# Patient Record
(demographics unavailable — no encounter records)

---

## 2024-11-29 NOTE — DISCUSSION/SUMMARY
[FreeTextEntry1] : Patient presents for: +abnormal EKG cardiac systolic murmur hypertension pre-diabetes obesity     Orders placed including imaging/meds:  - 2d TTE - c/w norvasc, weight loss management, lifestyle modifications. BP not optimal, no change in medical therapy during appt, recommend close follow up with PCP for adjustment of meds and we will obtain 2d TTE.   Patient warm and euvolemic. There is no evidence of active ACS, decompensated HF, uncontrolled arrhythmias or significant valvular heart disease at present. EKG is non-ischemic. We went over the EKG in office today, all questions answered. Vitals reviewed.   I've asked the patient to keep a blood pressure journal and report back if SBP >130 or DBP >90 on 2-3 separate random occasions. The patient is aware of alarm cardiac type symptoms, will call with questions or concerns and is aware to activate 911 and/or present to the closest emergency department if concerns.   Follow up: with me in 6 months or sooner as requested.     I strongly encouraged the patient to pursue the following preventative cardiology, lifestyle modifications which are necessary for long-term cardiovascular health. The following is recommended: Advised a mediterranean and/or plant predominant, high fiber, limited salt (ideal <2 g/day), low fat diet, stress reduction/psychosomatic management, sleep hygiene/KAELYN testing and healthy weight loss, annual influenza/preventive vaccines, medication + treatment adherence/compliance, high risk behavior mitigation (I.e. tobacco abstinence, alcohol abstinence, no binge drinking, recreational/illicit drug use abstinence), increased exercise activity aiming for 150 minutes per week of moderate physical activity as per AHA/ACC standard for long term cardiovascular risk reduction.    [EKG obtained to assist in diagnosis and management of assessed problem(s)] : EKG obtained to assist in diagnosis and management of assessed problem(s)

## 2024-11-29 NOTE — PHYSICAL EXAM
[Well Developed] : well developed [Well Nourished] : well nourished [No Acute Distress] : no acute distress [Obese] : obese [Normal Conjunctiva] : normal conjunctiva [Normal Venous Pressure] : normal venous pressure [No Carotid Bruit] : no carotid bruit [Normal S1, S2] : normal S1, S2 [No Murmur] : no murmur [No Rub] : no rub [No Gallop] : no gallop [Clear Lung Fields] : clear lung fields [Good Air Entry] : good air entry [No Respiratory Distress] : no respiratory distress  [Soft] : abdomen soft [Non Tender] : non-tender [No Masses/organomegaly] : no masses/organomegaly [Normal Bowel Sounds] : normal bowel sounds [Normal Gait] : normal gait [No Edema] : no edema [No Cyanosis] : no cyanosis [No Clubbing] : no clubbing [No Varicosities] : no varicosities [Normal Radial B/L] : normal radial B/L [Normal PT B/L] : normal PT B/L [No Rash] : no rash [No Skin Lesions] : no skin lesions [Moves all extremities] : moves all extremities [No Focal Deficits] : no focal deficits [Normal Speech] : normal speech [Alert and Oriented] : alert and oriented [Normal memory] : normal memory

## 2024-11-29 NOTE — HISTORY OF PRESENT ILLNESS
[FreeTextEntry1] : 61-year-old female with PMH of prediabetes, HLD, HTN, obesity, who presents for cardiovascular evaluation on referral from PCP Dr. Ivon Thomas for concerns of abnormal EKG and systolic heart murmur.  Concerns for systolic heart murmur by PCP no prior history of cardiac murmur long standing history of diet controlled chronic co-morbidities of htn and pre-diabetes, obesity on noravsc 5mg qday for hypertension without concerns or side effects Denies chest pain/pressure, palpitations, irregular and/or rapid heart beat, SOB, KHAN, syncope/near syncope, dizziness, orthopnea, PND, cough, edema, f/c, n/v/d, hematuria, or hematochezia. 1 year ago med for hot flashes, estrogen based. no prior cardiac workup.   Occupation: IT  Family/Support system: yes pets: cat if pets tick bites/scratch with infection: no Childhood cardiac history: no   Family cardiac history: sudden syncope/fainting with startle/drowning/exercise: no sudden cardiac death: no  premature ASCVD: no arrhythmia/pacemakers/defibrillators: no Heart failure/enlarged/weak heart/valvular: no PAD: no    Cardiovascular Lifestyle History: Mediterranean Diet Score (9 question survey) was 6. Modified keto diet. (8-9: optimal, 6-7: near-optimal, 4-5: suboptimal, 0-3: markedly suboptimal) Exercise: Patient reports exercising at a moderate level for 360 minutes per week. Recreational/illicit drugs: no Alcohol: weekend Caffeine: 1 espresso daily Tobacco (vaping/chewing/smoking): no Stress: able to cope with current life stressors. Sleep apnea: never tested, never screened, denies  Medication adherence: adherent SANFORD/MAFLD: no OTC/natural: alejandra   Women's Health: LMP: years  Age of menopause: 51 use of meds: no Pre-eclampsia/PPCM/HTN: diabetes PCOS: no Mammogram: dense breasts/GWENDOLYN: no

## 2024-11-29 NOTE — ASSESSMENT
[FreeTextEntry1] : 61-year-old female with PMH of prediabetes, HLD, HTN, obesity, who presents for cardiovascular evaluation on referral from PCP Dr. Ivon Thomas for concerns of abnormal EKG and systolic heart murmur.

## 2025-01-24 NOTE — ASSESSMENT
[FreeTextEntry1] : Pre dm - 6.4% on last labs lsm discussed  HLD - statin initiation  HTN - controlled on amlodipine - encouraged regular use of home bp cuff - c/w amlodipine  Obesity - c/w personal training - LSM discussed

## 2025-01-24 NOTE — HISTORY OF PRESENT ILLNESS
[Home] : at home, [unfilled] , at the time of the visit. [Medical Office: ([facility])___] : at the medical office located in  [Verbal consent obtained from patient] : the patient, [unfilled] [FreeTextEntry8] : 61F PMHx HTN, HLD presents to discuss a comprehensive wellness plan. She previously was following with a [facility] medical group who closed and was seeking completion of her comprehensive wellness plan she started with her [facility] medical group. Pt also requests a letter to continue her personal training sessions.

## 2025-01-24 NOTE — HISTORY OF PRESENT ILLNESS
[Home] : at home, [unfilled] , at the time of the visit. [Medical Office: ([facility])___] : at the medical office located in  [Verbal consent obtained from patient] : the patient, [unfilled] [FreeTextEntry8] : 61F PMHx HTN, HLD presents to discuss a comprehensive wellness plan. She previously was following with a [facility] medical group who closed and was seeking completion of her comprehensive wellness plan she started with her [facility] medical group. Pt also requests a letter to continue her personal training sessions.

## 2025-01-28 NOTE — HISTORY OF PRESENT ILLNESS
[de-identified] : Pt is 61-year-old female with PMH of prediabetes, HLD, HTN, obesity, chronic rhinitis, visiting for follow-up.  Patient reports she was seen at [facility] car 01/09 and diagnosed with pneumonia.  She was treated with prednisone 40mg x5 days and levofloxacin x7 days, with significant improvement.  Persist mild with cough and congestion but she has chronic rhinitis currently not on medication.  Reports being compliant with amlodipine at home.  She brings log; BP at home uncontrolled, mostly 140's/90's, lowest in 130's/80's and some in 150's.  Denies dizziness, chest pain, palpitations, SOB.  Admits frontal headache sometimes but she has chronic allergies.  Patient was seen by cardio, had TTE done showing EF 65 to 70% and mild to moderate LVH.  Patient wants to take care of her weight, diet and exercise.  She would like to have multidisciplinary approach since she has not had good long-term results with her previous diet and exercise.   Patient was traveling after last visit here and I could not contact her for lab results.  All reports informed.  [FreeTextEntry1] : Follow up

## 2025-01-28 NOTE — PHYSICAL EXAM
[No Acute Distress] : no acute distress [Well-Appearing] : well-appearing [Normal Sclera/Conjunctiva] : normal sclera/conjunctiva [Normal Oropharynx] : the oropharynx was normal [No Lymphadenopathy] : no lymphadenopathy [Supple] : supple [No Respiratory Distress] : no respiratory distress  [No Accessory Muscle Use] : no accessory muscle use [Clear to Auscultation] : lungs were clear to auscultation bilaterally [Normal Rate] : normal rate  [Regular Rhythm] : with a regular rhythm [Normal S1, S2] : normal S1 and S2 [No Murmur] : no murmur heard [No Varicosities] : no varicosities [No Edema] : there was no peripheral edema [No Extremity Clubbing/Cyanosis] : no extremity clubbing/cyanosis [Soft] : abdomen soft [Non Tender] : non-tender [No CVA Tenderness] : no CVA  tenderness [Grossly Normal Strength/Tone] : grossly normal strength/tone [Coordination Grossly Intact] : coordination grossly intact [No Focal Deficits] : no focal deficits [Normal Gait] : normal gait [Deep Tendon Reflexes (DTR)] : deep tendon reflexes were 2+ and symmetric [Normal Affect] : the affect was normal [Normal Mood] : the mood was normal [Normal Insight/Judgement] : insight and judgment were intact

## 2025-02-28 NOTE — PHYSICAL EXAM
[No Acute Distress] : no acute distress [Well-Appearing] : well-appearing [Normal Sclera/Conjunctiva] : normal sclera/conjunctiva [Normal Oropharynx] : the oropharynx was normal [No Lymphadenopathy] : no lymphadenopathy [Supple] : supple [No Respiratory Distress] : no respiratory distress  [Clear to Auscultation] : lungs were clear to auscultation bilaterally [Normal Rate] : normal rate  [Regular Rhythm] : with a regular rhythm [Normal S1, S2] : normal S1 and S2 [No Murmur] : no murmur heard [No Varicosities] : no varicosities [No Edema] : there was no peripheral edema [No Extremity Clubbing/Cyanosis] : no extremity clubbing/cyanosis [Soft] : abdomen soft [Non Tender] : non-tender [Grossly Normal Strength/Tone] : grossly normal strength/tone [Coordination Grossly Intact] : coordination grossly intact [No Focal Deficits] : no focal deficits [Normal Gait] : normal gait [Deep Tendon Reflexes (DTR)] : deep tendon reflexes were 2+ and symmetric [Normal Affect] : the affect was normal [Normal Mood] : the mood was normal [Normal Supraclavicular Nodes] : no supraclavicular lymphadenopathy [Normal Posterior Cervical Nodes] : no posterior cervical lymphadenopathy [Normal Anterior Cervical Nodes] : no anterior cervical lymphadenopathy

## 2025-02-28 NOTE — HISTORY OF PRESENT ILLNESS
[FreeTextEntry1] : Follow up BP  [de-identified] : Pt is 61-year-old female with PMH of prediabetes, HLD, HTN, obesity, chronic rhinitis, visiting for BP follow-up.  Patient was on management with amlodipine 5 mg daily, blood pressure remained elevated, and dose of amlodipine was increased to 10 mg daily.  Patient was monitoring BP at home showing improvement; SBP mostly 120s to 130s.  Denies dizziness, chest pain, palpitations, SOB.   Reports being compliant with lifestyle modifications, better diet and will start GYN next week.  Patient reports improvement of rhinitis while on both medications, the fluticasone and azelastine.  Patient has an appointment with GI in March for a screening colonoscopy.

## 2025-03-27 NOTE — HISTORY OF PRESENT ILLNESS
[FreeTextEntry1] : The patient is referred for routine colon cancer screening.  Her mother had "stomach cancer".  She states that she had a colostomy as a result.  She apparently underwent an upper endoscopy 6 years ago at [facility] which she believes was unremarkable other than the presence of H. pylori for which she was treated but does not recall ever having undergone a follow-up test to document eradication.  She is uncertain as to whether or not she underwent a colonoscopy at that same time.  She denies any abdominal pain, nausea, vomiting, diarrhea or constipation although her stools are very hard.  There is no family history of colon cancer.  She denies any dyspeptic symptoms.  Her appetite and weight are stable.  In November 2024 liver function tests were normal other than a slightly elevated alkaline phosphatase of 141.  The other liver function tests were normal.  CBC was normal other than a mild microcytosis of 78.  A repeat CBC in late January of this year was also normal other than an MCV of 76.  The platelet count was somewhat elevated at 458,000.  Iron studies were normal.  A B12 level was greater than 2000

## 2025-03-27 NOTE — ASSESSMENT
[FreeTextEntry1] : At this time I have recommended that she undergo a urea breath test as well as repeat liver function test, GGTP, 5 nucleotidase and a hemoglobin electrophoresis.  In the meantime she will be scheduled for screening colonoscopy.  She will try to obtain the results of her prior endoscopy as well as the results of any colonoscopy which may or may not have been performed.  If it appears that she did indeed undergo a colonoscopy which was otherwise unremarkable with no polyps the colonoscopy can be canceled.  Otherwise I will proceed with a screening colonoscopy presuming that she has never undergone a colonoscopy in the past. The risks, benefits, complications and possible adverse consequences associated with colonoscopy were discussed with the patient.

## 2025-03-27 NOTE — PHYSICAL EXAM
[Alert] : alert [Normal Voice/Communication] : normal voice/communication [Healthy Appearing] : healthy appearing [No Acute Distress] : no acute distress [Obese (BMI >= 30)] : obese (BMI >= 30) [Sclera] : the sclera and conjunctiva were normal [Hearing Threshold Finger Rub Not [place]] : hearing was normal [Normal Lips/Gums] : the lips and gums were normal [Oropharynx] : the oropharynx was normal [Normal Appearance] : the appearance of the neck was normal [No Neck Mass] : no neck mass was observed [No Respiratory Distress] : no respiratory distress [No Acc Muscle Use] : no accessory muscle use [Respiration, Rhythm And Depth] : normal respiratory rhythm and effort [Auscultation Breath Sounds / Voice Sounds] : lungs were clear to auscultation bilaterally [Heart Rate And Rhythm] : heart rate was normal and rhythm regular [Normal S1, S2] : normal S1 and S2 [Murmurs] : no murmurs [Bowel Sounds] : normal bowel sounds [Abdomen Tenderness] : non-tender [No Masses] : no abdominal mass palpated [Abdomen Soft] : soft [] : no hepatosplenomegaly [Oriented To Time, Place, And Person] : oriented to person, place, and time

## 2025-06-06 NOTE — PHYSICAL EXAM
[Alert] : alert [Normal Voice/Communication] : normal voice/communication [Healthy Appearing] : healthy appearing [No Acute Distress] : no acute distress [Obese (BMI >= 30)] : obese (BMI >= 30) [Sclera] : the sclera and conjunctiva were normal [Hearing Threshold Finger Rub Not [place]] : hearing was normal [Normal Lips/Gums] : the lips and gums were normal [Oropharynx] : the oropharynx was normal [Normal Appearance] : the appearance of the neck was normal [No Neck Mass] : no neck mass was observed [No Respiratory Distress] : no respiratory distress [No Acc Muscle Use] : no accessory muscle use [Respiration, Rhythm And Depth] : normal respiratory rhythm and effort [Auscultation Breath Sounds / Voice Sounds] : lungs were clear to auscultation bilaterally [Heart Rate And Rhythm] : heart rate was normal and rhythm regular [Normal S1, S2] : normal S1 and S2 [Murmurs] : no murmurs [Bowel Sounds] : normal bowel sounds [Abdomen Tenderness] : non-tender [No Masses] : no abdominal mass palpated [Abdomen Soft] : soft [] : no hepatosplenomegaly [Oriented To Time, Place, And Person] : oriented to person, place, and time